# Patient Record
(demographics unavailable — no encounter records)

---

## 2025-04-07 NOTE — ASSESSMENT
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 Will send serologies.   High suspicion pt has GN and maybe lung involvement from same vasculitis process.   Concern w RLE swelling, as higher incidence of DVTs in vasculitis. Will send urgent US to assess for DVT.    Plan -Labs w serologies, inflammatory markers -Doppler of RLE  -CT chest high resolution  -will likely need renal bx, although patient hesitant RTO depending on above results

## 2025-04-07 NOTE — CONSULT LETTER
[Dear  ___] : Dear  [unfilled], [Consult Letter:] : I had the pleasure of evaluating your patient, [unfilled]. [Consult Closing:] : Thank you very much for allowing me to participate in the care of this patient.  If you have any questions, please do not hesitate to contact me. [Sincerely,] : Sincerely, [FreeTextEntry2] : Dr. Trace Davidson [address] ,  [place] [address] [FreeTextEntry3] : Dennis Patel MD

## 2025-04-07 NOTE — HISTORY OF PRESENT ILLNESS
[FreeTextEntry1] : 46 y/o F here for consultation  Pt reports 1 year cough on and off. Pt says she has gotten abx, but never resolved.   Pt had UTI infection, and it resolved.   Pt says she was given abx, and developed LE rash.   Pt was tested for mono, which was negative?   Pt developed deep rashes in LE.   Pt had biopsy at [facility], which showed LCV.   No fevers, h/a,  hair loss, oral ulcers, epistaxis, sinusitis,  swollen glands, dry mouth, dry eyes, cough, vision changes, abdominal pain, GERD, n/v/d, blood in stool or urine, focal weakness, sensory loss,  Raynaud's, weight loss.  +dry mouth  +some back pain   OB: no hx of miscarriage; no preeclampsia

## 2025-04-07 NOTE — PHYSICAL EXAM
[Auscultation Breath Sounds / Voice Sounds] : lungs were clear to auscultation bilaterally [Heart Sounds] : normal S1 and S2 [Heart Sounds Gallop] : no gallops [Murmurs] : no murmurs [Abdomen Soft] : soft [] : no hepato-splenomegaly [Cervical Lymph Nodes Enlarged Posterior Bilaterally] : posterior cervical [Cervical Lymph Nodes Enlarged Anterior Bilaterally] : anterior cervical [Supraclavicular Lymph Nodes Enlarged Bilaterally] : supraclavicular [Musculoskeletal - Swelling] : no joint swelling seen [Oriented To Time, Place, And Person] : oriented to person, place, and time [FreeTextEntry1] : b/l legs w purpuric rash, R shin w ulceration

## 2025-04-28 NOTE — ASSESSMENT
[FreeTextEntry1] : Ms. 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 Suspect UACS/postnasal drip and GERD. HRCT chest 4/2025 unremarkable.   Plan: - Trial of intranasal fluticasone 2 sprays each nostril daily for possible allergic rhinitis/postnasal drip/UACS - Obtain full PFTs prior to next appt, including lung volume, spirometry, bronchodilator responsiveness, DLCO - Consider allergy testing - Counseled regarding lifestyle modifications including weight loss, diet changes, aspiration precautions to mitigate GERD sx - Discussed at length dx of vasculitis, bx results, and possibility of developing nephrotic range proteinuria as well as manifestations of vasculitis in lungs/pulm vasc bed. Pt to reconsider renal bx/ritux - Return to clinic in 1-2 months to re-evaluate respiratory / sleep sx and review results of ___  . 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

## 2025-04-28 NOTE — PROCEDURE
[FreeTextEntry1] : 04/21/2025 [facility] CT CHEST FINDINGS: LUNGS AND LARGE AIRWAYS: Patent central airways. Clear lungs PLEURA: No pleural effusion. VESSELS: Within normal limits. HEART: Heart size is normal. No pericardial effusion. MEDIASTINUM AND APURVA: No lymphadenopathy. CHEST WALL AND LOWER NECK: Within normal limits. VISUALIZED UPPER ABDOMEN: Within normal limits. BONES: Degenerative changes. IMPRESSION: No acute findings in the chest.

## 2025-04-28 NOTE — HISTORY OF PRESENT ILLNESS
[Former] : former [< 20 pack-years] : < 20 pack-years [Never] : never [TextBox_4] : Ms. 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 04/28/2025 Chronic cough after a viral URI winter 2024. Not related to cold exposure. The past 2 suárez has had cough like this. Now intermittent cough. Every night will have some cough, and in the morning has phlegm that she feels a need to cough out. Phlegm is scant sometimes white to green. Admits to having a lot of nasal congestion. Saw ENT 1 month ago, was told R nare was dry, given flonase, then given amoxicillin with rash in legs for which she had skin bx done, consistent with LCV. Pt and  voice apprehension re: initiating rituxan and re: renal bx. At this time, she denies anginal/pleuritic CP, SOB/SALDAÑA, wheezing, stridor, orthopnea, hemoptysis, LE edema, sick contacts, recent travel, history of frequent URIs/LRTIs, f/c/n/v. Recent use of prednisone for vasculitis. [TextBox_11] : 1/3 [TextBox_13] : 20 [YearQuit] : 1/2025

## 2025-04-28 NOTE — REVIEW OF SYSTEMS
[Cough] : cough [Negative] : Endocrine [Nasal Congestion] : nasal congestion [Postnasal Drip] : postnasal drip [Edema] : edema [Rash] : rash [TextBox_104] : LE rash

## 2025-04-28 NOTE — PHYSICAL EXAM
[No Acute Distress] : no acute distress [Normal Oropharynx] : normal oropharynx [Normal Appearance] : normal appearance [No Neck Mass] : no neck mass [Normal Rate/Rhythm] : normal rate/rhythm [Normal S1, S2] : normal s1, s2 [No Murmurs] : no murmurs [No Resp Distress] : no resp distress [Clear to Auscultation Bilaterally] : clear to auscultation bilaterally [No Abnormalities] : no abnormalities [Benign] : benign [Normal Gait] : normal gait [No Clubbing] : no clubbing [No Cyanosis] : no cyanosis [No Edema] : no edema [FROM] : FROM [No Focal Deficits] : no focal deficits [Oriented x3] : oriented x3 [Normal Affect] : normal affect [TextBox_2] : obese, mild anasarca [TextBox_125] : raised papular rash LEs; RLE with eschar and surrounding mild erythema

## 2025-05-09 NOTE — REVIEW OF SYSTEMS
[SOB on Exertion] : shortness of breath during exertion [Fever] : no fever [Chills] : no chills [Nosebleeds] : no nosebleeds [Sore Throat] : no sore throat [Chest Pain] : no chest pain [Palpitations] : no palpitations [Shortness Of Breath] : no shortness of breath [Wheezing] : no wheezing [Cough] : no cough [Abdominal Pain] : no abdominal pain [Vomiting] : no vomiting [Constipation] : no constipation [Easy Bleeding] : no tendency for easy bleeding [Easy Bruising] : no tendency for easy bruising

## 2025-05-09 NOTE — HISTORY OF PRESENT ILLNESS
[FreeTextEntry1] : Patient is a 47 year old female with a past medical history of Hepatitis B infection, Vasculitis, small vessels vasculitis, LE w pupuric rash, ulceration. Patient has now been referred to IR by Dr. Patel for consultation regarding a renal biopsy.   Patient denies recent fever, chills, shortness of breath, chest pain, nausea, vomiting or diarrhea.   US 4/21/25 "No evidence of right lower extremity deep venous thrombosis."

## 2025-05-09 NOTE — ASSESSMENT
[FreeTextEntry1] : 47 year old female with history of vasculitis referred for image guided renal biopsy due to proteinuria.   Imaging and chart reviewed. Imaging personally reviewed with patient.  Patient is appropriate candidate for renal parenchymal biopsy.  Will plan for procedure with sedation. Preprocedure instructions given.  Discussed procedure details in length. All questions answered. Sidedness to be determined at the time of procedure.   Patient requested to sign consent in person.    Modality:CT Position: Prone Anesthesia: Sedation

## 2025-05-09 NOTE — REASON FOR VISIT
[Consultation] : a consultation visit [Medical Office: ([facility])___] : at the medical office located in  [Telehealth (audio & video)] : This visit was provided via telehealth using real-time 2-way audio visual technology. [Verbal consent obtained from patient] : the patient, [unfilled] [Other Location: e.g. School (Enter Location, City,State)___] : at [unfilled], at the time of the visit. [FreeTextEntry1] : Renal biopsy

## 2025-05-09 NOTE — REASON FOR VISIT
[Consultation] : a consultation visit [Medical Office: ([facility])___] : at the medical office located in  [Telehealth (audio & video)] : This visit was provided via telehealth using real-time 2-way audio visual technology. [Verbal consent obtained from patient] : the patient, [unfilled] [Other Location: e.g. School (Enter Location, City,State)___] : at [unfilled], at the time of the visit. [FreeTextEntry1] : Renal biopsy

## 2025-07-23 NOTE — HISTORY OF PRESENT ILLNESS
[Former] : former [< 20 pack-years] : < 20 pack-years [Never] : never [TextBox_4] : Ms. 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 04/28/2025 Chronic cough after a viral URI winter 2024. Not related to cold exposure. The past 2 suárez has had cough like this. Now intermittent cough. Every night will have some cough, and in the morning has phlegm that she feels a need to cough out. Phlegm is scant sometimes white to green. Admits to having a lot of nasal congestion. Saw ENT 1 month ago, was told R narmadonna was dry, given flonase, then given amoxicillin with rash in legs for which she had skin bx done, consistent with LCV. Pt and  voice apprehension re: initiating rituxan and re: renal bx.   07/23/2025 Since last visit, pt underwent renal bx, started on mycophenolate/pred. Pt does not have any cough. No active respiratory sx. Continues to have hematuria. Used nasal sprays for several wks but then postnasal drip resolved and pt did not require. Is trying to adhere to GERD diet, not on PPI/H2 blocker while on steroids but says she is instead watching diet. At this time, she denies anginal/pleuritic CP, SOB/SALDAÑA, wheezing, stridor, orthopnea, hemoptysis, LE edema, sick contacts, recent travel, history of frequent URIs/LRTIs, f/c/n/v. Recent use of prednisone for vasculitis.  [TextBox_11] : 1/3 [TextBox_13] : 20 [YearQuit] : 1/2025

## 2025-07-23 NOTE — PROCEDURE
[FreeTextEntry1] : 04/21/2025 [facility] CT CHEST FINDINGS: LUNGS AND LARGE AIRWAYS: Patent central airways. Clear lungs PLEURA: No pleural effusion. VESSELS: Within normal limits. HEART: Heart size is normal. No pericardial effusion. MEDIASTINUM AND APURVA: No lymphadenopathy. CHEST WALL AND LOWER NECK: Within normal limits. VISUALIZED UPPER ABDOMEN: Within normal limits. BONES: Degenerative changes. IMPRESSION: No acute findings in the chest.

## 2025-07-23 NOTE — REVIEW OF SYSTEMS
[Rash] : rash [Negative] : Endocrine [Edema] : edema [TextBox_14] : See HPI above  [TextBox_30] : See HPI above  [TextBox_104] : LE rash

## 2025-07-23 NOTE — ASSESSMENT
[FreeTextEntry1] : Ms. 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 Suspect UACS/postnasal drip and GERD. HRCT chest 4/2025 unremarkable.   Plan: - Trial of intranasal fluticasone 2 sprays each nostril daily for possible allergic rhinitis/postnasal drip/UACS - PFTs reviewed in full: grossly normal exam - Counseled regarding lifestyle modifications including weight loss, diet changes, aspiration precautions to mitigate GERD sx - Pt prefers to f/u as needed. Return to clinic as needed to re-evaluate respiratory sx. Pt knows to call for any interval pulmonary issues or concerns